# Patient Record
Sex: FEMALE | Race: WHITE | NOT HISPANIC OR LATINO | Employment: FULL TIME | URBAN - METROPOLITAN AREA
[De-identification: names, ages, dates, MRNs, and addresses within clinical notes are randomized per-mention and may not be internally consistent; named-entity substitution may affect disease eponyms.]

---

## 2018-01-14 ENCOUNTER — HOSPITAL ENCOUNTER (EMERGENCY)
Facility: CLINIC | Age: 53
Discharge: HOME | End: 2018-01-14
Attending: PHYSICIAN ASSISTANT | Admitting: PHYSICIAN ASSISTANT
Payer: COMMERCIAL

## 2018-01-14 VITALS
DIASTOLIC BLOOD PRESSURE: 110 MMHG | WEIGHT: 182 LBS | TEMPERATURE: 97.6 F | OXYGEN SATURATION: 98 % | HEART RATE: 87 BPM | SYSTOLIC BLOOD PRESSURE: 189 MMHG | RESPIRATION RATE: 16 BRPM

## 2018-01-14 DIAGNOSIS — J06.9 VIRAL UPPER RESPIRATORY TRACT INFECTION WITH COUGH: ICD-10-CM

## 2018-01-14 LAB
FLUAV+FLUBV AG SPEC QL: NEGATIVE
FLUAV+FLUBV AG SPEC QL: NEGATIVE
INTERNAL QC OK POCT: YES
S PYO AG THROAT QL IA.RAPID: NORMAL
SPECIMEN SOURCE: NORMAL

## 2018-01-14 PROCEDURE — 87880 STREP A ASSAY W/OPTIC: CPT | Performed by: PHYSICIAN ASSISTANT

## 2018-01-14 PROCEDURE — 87081 CULTURE SCREEN ONLY: CPT | Performed by: PHYSICIAN ASSISTANT

## 2018-01-14 PROCEDURE — G0463 HOSPITAL OUTPT CLINIC VISIT: HCPCS

## 2018-01-14 PROCEDURE — 99213 OFFICE O/P EST LOW 20 MIN: CPT | Mod: Z6 | Performed by: PHYSICIAN ASSISTANT

## 2018-01-14 PROCEDURE — 87804 INFLUENZA ASSAY W/OPTIC: CPT | Performed by: PHYSICIAN ASSISTANT

## 2018-01-14 NOTE — ED AVS SNAPSHOT
[facility]    [address] [address]    Phone:  [phone]    Fax:  [phone]                                       Joyce Hodge   MRN: [id number]    Department:  [facility]   Date of Visit:  1/14/2018           Patient Information     Date Of Birth          1965        Your diagnoses for this visit were:     Viral upper respiratory tract infection with cough        You were seen by Delon Weaver PA-C.        Discharge Instructions         Viral Upper Respiratory Illness (Adult)  You have a viral upper respiratory illness (URI), which is another term for the common cold. This illness is contagious during the first few days. It is spread through the air by coughing and sneezing. It may also be spread by direct contact (touching the sick person and then touching your own eyes, nose, or mouth). Frequent handwashing will decrease risk of spread. Most viral illnesses go away within 7 to 10 days with rest and simple home remedies. Sometimes the illness may last for several weeks. Antibiotics will not kill a virus, and they are generally not prescribed for this condition.    Home care    If symptoms are severe, rest at home for the first 2 to 3 days. When you resume activity, don't let yourself get too tired.    Avoid being exposed to cigarette smoke (yours or others ).    You may use acetaminophen or ibuprofen to control pain and fever, unless another medicine was prescribed. (Note: If you have chronic liver or kidney disease, have ever had a stomach ulcer or gastrointestinal bleeding, or are taking blood-thinning medicines, talk with your healthcare provider before using these medicines.) Aspirin should never be given to anyone under 18 years of age who is ill with a viral infection or fever. It may cause severe liver or brain damage.    Your appetite may be poor, so a light diet is fine. Avoid dehydration by drinking 6 to  8 glasses of fluids per day (water, soft drinks, juices, tea, or soup). Extra fluids will help loosen secretions in the nose and lungs.    Over-the-counter cold medicines will not shorten the length of time you re sick, but they may be helpful for the following symptoms: cough, sore throat, and nasal and sinus congestion. (Note: Do not use decongestants if you have high blood pressure.)  Follow-up care  Follow up with your healthcare provider, or as advised.  When to seek medical advice  Call your healthcare provider right away if any of these occur:    Cough with lots of colored sputum (mucus)    Severe headache; face, neck, or ear pain    Difficulty swallowing due to throat pain    Fever of 100.4 F (38 C)  Call 911, or get immediate medical care  Call emergency services right away if any of these occur:    Chest pain, shortness of breath, wheezing, or difficulty breathing    Coughing up blood    Inability to swallow due to throat pain  Date Last Reviewed: 9/13/2015 2000-2017 [employer] [address] [place] [address]. All rights reserved. This information is not intended as a substitute for professional medical care. Always follow your healthcare professional's instructions.          24 Hour Appointment Hotline       To make an appointment at any [facility], call [phone] ([phone]). If you don't have a family doctor or clinic, we will help you find one. [facility] are conveniently located to serve the needs of you and your family.             Review of your medicines      Our records show that you are taking the medicines listed below. If these are incorrect, please call your family doctor or clinic.        Dose / Directions Last dose taken    IBUPROFEN PO        Refills:  0        LOVASTATIN PO        Refills:  0        oseltamivir 75 MG capsule   Commonly known as:  TAMIFLU   Dose:  75 mg   Quantity:  10 capsule        Take 1 capsule (75 mg) by mouth 2 times  "daily   Refills:  0                Procedures and tests performed during your visit     Beta strep group A culture    Influenza A/B antigen    Rapid strep group A screen POCT      Orders Needing Specimen Collection     None      Pending Results     No orders found from 1/12/2018 to 1/15/2018.            Pending Culture Results     No orders found from 1/12/2018 to 1/15/2018.            Pending Results Instructions     If you had any lab results that were not finalized at the time of your Discharge, you can call the ED Lab Result RN at [phone]. You will be contacted by this team for any positive Lab results or changes in treatment. The nurses are available 7 days a week from 10A to 6:30P.  You can leave a message 24 hours per day and they will return your call.        Test Results From Your Hospital Stay        1/14/2018  6:31 PM      Component Results     Component Value Ref Range & Units Status    Influenza A/B Agn Specimen Nasal  Final    Influenza A Negative NEG^Negative Final    Influenza B Negative NEG^Negative Final    Test results must be correlated with clinical data. If necessary, results   should be confirmed by a molecular assay or viral culture.           1/14/2018  6:05 PM      Component Results     Component Value Ref Range & Units Status    Rapid Strep A Screen Neg neg Final    Internal QC OK Yes  Final                Thank you for choosing [facility]       Thank you for choosing [facility] for your care. Our goal is always to provide you with excellent care. Hearing back from our patients is one way we can continue to improve our services. Please take a few minutes to complete the written survey that you may receive in the mail after you visit with us. Thank you!        [employer] Information     [employer] lets you send messages to your doctor, view your test results, renew your prescriptions, schedule appointments and more. To sign up, go to [email or web address] . Click on \"Log in\" on the left side " "of the screen, which will take you to the Welcome page. Then click on \"Sign up Now\" on the right side of the page.     You will be asked to enter the access code listed below, as well as some personal information. Please follow the directions to create your username and password.     Your access code is: [email or web address]  Expires: 2018  6:39 PM     Your access code will  in 90 days. If you need help or a new code, please call your [place] clinic or [phone].        Care EveryWhere ID     This is your Care EveryWhere ID. This could be used by other organizations to access your [facility] medical records  [id number]        Equal Access to Services     FREDI BARTH : Xander Doherty, ciro sparrow, óscar duncan, fior dudley. So [facility] [phone].    ATENCIÓN: Si habla español, tiene a mcclure disposición servicios gratuitos de asistencia lingüística. Llame al [phone].    We comply with applicable federal civil rights laws and [place] laws. We do not discriminate on the basis of race, color, national origin, age, disability, sex, sexual orientation, or gender identity.            After Visit Summary       This is your record. Keep this with you and show to your community pharmacist(s) and doctor(s) at your next visit.                  "

## 2018-01-14 NOTE — ED AVS SNAPSHOT
[facility]    [address] [address]    Phone:  [phone]    Fax:  [phone]                                       Joyce Hodge   MRN: [id number]    Department:  [facility]   Date of Visit:  1/14/2018           After Visit Summary Signature Page     I have received my discharge instructions, and my questions have been answered. I have discussed any challenges I see with this plan with the nurse or doctor.    ..........................................................................................................................................  Patient/Patient Representative Signature      ..........................................................................................................................................  Patient Representative Print Name and Relationship to Patient    ..................................................               ................................................  Date                                            Time    ..........................................................................................................................................  Reviewed by Signature/Title    ...................................................              ..............................................  Date                                                            Time

## 2018-01-14 NOTE — ED PROVIDER NOTES
Chief Complaint:     Chief Complaint   Patient presents with     Influenza       HPI: Joyce Hodge is an 52 year old female who presents with a cough.   It began  1 day(s) ago and has unchanged.  Cough is dry There is NO, Wheezing, Shortness of Breath, Tightness in chest and chest pain.  She was exposed to influenza A at home.  She is eating and drinking well.      Associated symptoms: sore throat, congestion, achiness and low grade fevers.    Recent travel?  no.    No abdominal pain, or diarrhea,  No vomiting.  No headache or neck pain.    ROS: Further problem focused system review was otherwise negative.     Respiratory History  occasional episodes of bronchitis     Problem history  There is no problem list on file for this patient.       Allergies  Allergies   Allergen Reactions     Morphine Difficulty breathing     Amoxicillin Rash        Smoking History  History   Smoking Status     Current Every Day Smoker     Packs/day: 0.50   Smokeless Tobacco     Not on file        Current Meds  No current facility-administered medications for this encounter.     Current Outpatient Prescriptions:      IBUPROFEN PO, , Disp: , Rfl:      LOVASTATIN PO, , Disp: , Rfl:      oseltamivir (TAMIFLU) 75 MG capsule, Take 1 capsule (75 mg) by mouth 2 times daily, Disp: 10 capsule, Rfl: 0        OBJECTIVE     Vital signs reviewed by Delon Weaver  BP (!) 189/110  Pulse 87  Temp 97.6  F (36.4  C) (Temporal)  Resp 16  Wt 82.6 kg (182 lb)  SpO2 98%     PEFR:  General appearance: healthy, alert and no distress  Ears: R TM - normal: no effusions, no erythema, and normal landmarks, L TM - normal: no effusions, no erythema, and normal landmarks  Eyes: R normal, L normal  Nose: normal  Oropharynx: moderate erythema  Neck: supple and no adenopathy  Lungs: normal and clear to auscultation  Heart: S1, S2 normal, no murmur, click, rub or gallop, regular rate and rhythm, chest is clear without rales or wheezing, no pedal edema, no  JVD, no hepatosplenomegaly        Labs:     Results for orders placed or performed during the hospital encounter of 01/14/18   Rapid strep group A screen POCT   Result Value Ref Range    Rapid Strep A Screen Neg neg    Internal QC OK Yes    Influenza A/B antigen   Result Value Ref Range    Influenza A/B Agn Specimen Nasal     Influenza A Negative NEG^Negative    Influenza B Negative NEG^Negative          ASSESSMENT    1. Viral upper respiratory tract infection with cough        PLAN  RST was negative.  We will call with culture results if positive.   Rest, Push fluids, vaporizer, elevation of head of bed.  Ibuprofen and or Tylenol for any fever or body aches.  Over the counter cough suppressant- PRN- as discussed.   If symptoms worsen, recheck immediately otherwise follow up with your PCP PRN.  Patient verbalized understanding and agreed with this plan.         Delon Weaver  1/14/2018, 5:28 PM       Delon Weaver PA-C  01/14/18 1833

## 2018-01-15 NOTE — DISCHARGE INSTRUCTIONS

## 2018-01-16 LAB
BACTERIA SPEC CULT: NORMAL
Lab: NORMAL
SPECIMEN SOURCE: NORMAL

## 2022-02-02 ENCOUNTER — HOSPITAL ENCOUNTER (EMERGENCY)
Facility: CLINIC | Age: 57
Discharge: HOME | End: 2022-02-02
Attending: FAMILY MEDICINE | Admitting: FAMILY MEDICINE
Payer: COMMERCIAL

## 2022-02-02 DIAGNOSIS — F32.A DEPRESSION, UNSPECIFIED DEPRESSION TYPE: ICD-10-CM

## 2022-02-02 PROCEDURE — 99285 EMERGENCY DEPT VISIT HI MDM: CPT | Performed by: FAMILY MEDICINE

## 2022-02-02 PROCEDURE — 99285 EMERGENCY DEPT VISIT HI MDM: CPT | Mod: 25 | Performed by: FAMILY MEDICINE

## 2022-02-02 PROCEDURE — 90791 PSYCH DIAGNOSTIC EVALUATION: CPT
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
## 2022-02-02 NOTE — DISCHARGE INSTRUCTIONS
"Aftercare Plan  If I am feeling unsafe or I am in a crisis, I will:   Contact my established care providers   Call the National Suicide Prevention Lifeline: [phone]   Go to the nearest emergency room   Call 911     Warning signs that I or other people might notice when a crisis is developing for me: Thoughts I would be better off dead, Not taking care of myself,     Things I am able to do on my own to cope or help me feel better: Burning lily, playing games and being on the phone     Things that I am able to do with others to cope or help me better: Open up and talk, support group, go for a walk,     Things I can use or do for distraction: Go on phone, exercise, burn lily, watch something funny, socialize     Changes I can make to support my mental health and wellness: Make sure I am prioritizing my health. Self Care Practices     People in my life that I can ask for help: , Primary Care Physician, New Therapist     Your [facility] has a mental health crisis team you can call 24/7: [facility] Crisis  [phone]    Other things that are important when I m in crisis: To practice self care. \"In order to take care of others, I have to model and take care of myself.\"     Additional resources and information:       Scheduled Appointment  Date: Monday, 2/7/2022  Time: 9:00 am - 10:00 am  Provider: Archana Gutierrez MA  [facility]  Location: [facility] [employer], [facility], [address] [place] [address], [address], [place] [address]  Phone: [phone]  Type: Teletherapy    Scheduling Instructions  PLEASE DO NOT IGNORE THESE INSTRUCTIONS. WE ARE UNWILLING TO MEET WITH PATIENTS WHOSE SCHEDULERS DO NOT PROVIDE AN EMAIL ADDRESS, INSURANCE INFO, AND A PHONE NUMBER. WE ARE ALSO UNWILLING TO MEET WITH PATIENTS WHO DO NOT WANT TO GO TO COUNSELING :/DO NOT SCHEDULE CLIENTS UNDER 24 hours I am not in network with [facility] (I am in network with [employer]). If a patient has bcbs medicare or [employer], please don't schedule them with me as " "I'm not in network with medicare.    Crisis Lines  Crisis Text Line  Text 770402  You will be connected with a trained live crisis counselor to provide support.    National Hope Line  1.800.SUICIDE [3276574]      Community Resources  Fast Tracker  Linking people to mental health and substance use disorder resources  CrowdFlower.Xsilon     [place] Mental Health Warm Line  Peer to peer support  Monday thru Saturday, 12 pm to 10 pm  502.079.5726 or 2.837.403.9907  Text \"Support\" to 91176    National [place] on Mental Illness (ESPERANZA)  678.321.3357 or 1.888.ESPERANZA.HELPS        Mental Health Apps  My3  https://FameCast.org/    VirtualHopeBox  https://[employer]/apps/virtual-hope-box/    1. Eat Healthy    Mental health and wellness from the inside out and food can be a great benefit. Take 10 today by making a healthy meal, or identifying ways that you can better fuel your body through food.      Ironman All-World athlete Cindy Schuler believes that eating healthy is all about setting goals, identifying what works for you, and finding balance!      You have to offer yourself clement for when you do slip up, and getting right back on track,  said Cindy Schuler.     2. Exercise    Physical activity is a cornerstone of what we do at [facility]. There s increasing evidence that it s effective in supporting solutions for both mental and physical health challenges.     Take 10 today by getting active. 1st Lt. Farrah Martin, Ms. [place] Laura 2020 Finalist, will ruck 10 miles on October 10 - her own way to Pledge 10 and shine a spotlight on the benefits of physical activity on mental health.     Life can often feel chaotic and out of our control especially when we are always taking care of others. Physical activity helps me regain my center of focus; for that window of time, it s about taking care of myself. When I have the physical or tangible reminder of  it s okay to focus on/take care of me  it also serves as a " reminder to take care of myself mentally and emotionally as well,  said Farrah Martin.     3. Sleep    Stress can cause insomnia and loss of sleep can become a stressor. Sleep is an often overlooked, but critical component of mental and physical health. Take a look at your sleep patterns to see if they can be improved.     The Department of Veterans Affairs has some great tips to help you sleep in this [employer] blog post, and [facility] Madhu Mace (Dr.), a competitive runner, has some great perspective to offer on rest, recovery, and sleep in this [employer] blog.      I think sleep is probably the most underappreciated recovery tool because sleep is where repetitive processes take place - from many aspects, not just muscle recovery, it s brain cleansing and hormonal resets,  said Dr. Florence.     4. Avoid Alcohol    During stressful times, people sometimes turn to alcohol to relieve stress or cope with symptoms of mental health challenges. The misuse of alcohol in this way can lead to substance use disorders (MARINO) or other health, relationship, and employment challenges.    Take 10 today by avoiding alcohol, assessing your relationship with alcohol, or reaching out if you need assistance. The Department of Veterans Affairs has information, resources, and a self-assessment tool to help.    5. Stay Connected    Staying connected is a great way to combat the isolation that comes after  from the , or even to combat the isolation Americans across the globe are feeling due to the COVID-19 pandemic.    Experts from the [employer] Agency say that friendships and relationships also positively impact physical health. These relationships are still possible even while social distancing. Organizations and [employer] are offering various online events ranging from art workshops (our friends at [employer] offer arts workshops for veterans, service members, and their families)  to [employer] game tournamCloudcity, and more.    The [employer] [facility] [employer] offers daily opportunities to get involved virtually and tackle challenges with veterans and supporters across the nation.     A sense of belonging to a greater community improves your motivation, health, and happiness. When you see your connection to others, you know that all people struggle and have difficult times. You are not alone,  said Captain Angelika Bell DNP, MS Chief of [employer] at [facility].    6. Take a tech break    Staying connected is important, but it can be easy to become overwhelmed by the news and social media. If you ve found yourself feeling overwhelmed, take 10 with a technology break. Go for a walk, spend some time journaling, or play a board game - on your own or with family and friends. This blog is full of ideas!    7. Meditate or Breathe  Meditation and breathing exercises are great ways to take a few moments to connect with yourself, identify where you re at, and address challenges you are facing.    If you re looking for a guided meditation program, look no further than this Open Road Challenge meditation session presented by our partners at [employer]. If you re interested in coherent breathing exercises, check out this practice by Leon Puckett, Co- of [employer].    [employer], in partnership with [employer], also offers free subscriptions to service members and their families. [employer] employees also receive free access to [employer] as part of the company s commitment to address diverse mental health needs    8. Ask for help    Asking for help is a sign of strength. While you re taking 10, if you find yourself with overwhelming feelings or believe you would benefit from professional assistance, there are many resources out there for you.     If you re in crisis, the Department of Veterans Affairs Crisis Line (1-876.821.6347) and National Suicide Prevention Lifeline (1-885.939.7197) are  here for you right now. If you re looking for counseling or other types of assistance, click here for a list of partners and resources for veterans and their families.    9. Stay informed     Staying up to date on mental health and physical health best practices is a great way to support and encourage your own mental health hygiene.     Using trusted sources, like this National Institutes of Health Emotional Wellness Toolkit, can offer new ideas and new ways to adjust your habits and make a plan.     10. Get outside  Sometimes, just getting outside for a walk around the neighborhood, going on a short road trip to a national park, or even sitting outside with your pet makes all the difference.

## 2022-02-02 NOTE — ED TRIAGE NOTES
Pt speaking with DEC for son's ER visit. Pt needed to be arrived as a pt to receive the resources that she needed for herself.

## 2022-02-02 NOTE — ED PROVIDER NOTES
"  HPI   The patient is a 56-year-old female presenting by private car with concern for depression and suicidal ideation.  She is here with her son because he is having outbursts of anger.  She does not feel safe at home.  She is torn because she has to find placement for her son and yet she acknowledges that she loves him dearly and does not want him to leave.  She has transient thoughts of killing herself \"so this is just done.\"  No plan.  She has not had previous attempts.  She is requesting help as an outpatient.        Allergies:  Allergies   Allergen Reactions     Morphine Difficulty breathing     Amoxicillin Rash     Problem List:    There are no problems to display for this patient.     Past Medical History:    No past medical history on file.  Past Surgical History:    No past surgical history on file.  Family History:    No family history on file.  Social History:  Marital Status:   [2]  Social History     Tobacco Use     Smoking status: Current Every Day Smoker     Packs/day: 0.50     Smokeless tobacco: Not on file   Substance Use Topics     Alcohol use: Yes     Drug use: No      Medications:    IBUPROFEN PO  LOVASTATIN PO  oseltamivir (TAMIFLU) 75 MG capsule      Review of Systems   All other systems reviewed and are negative.      PE      Physical Exam  Vitals reviewed.   Constitutional:       General: She is not in acute distress.     Appearance: She is well-developed.      Comments: Tired appearing.  Stressed but cooperative.   HENT:      Head: Normocephalic and atraumatic.      Right Ear: External ear normal.      Left Ear: External ear normal.      Nose: Nose normal.      Mouth/Throat:      Mouth: Mucous membranes are moist.      Pharynx: Oropharynx is clear.   Eyes:      Extraocular Movements: Extraocular movements intact.      Conjunctiva/sclera: Conjunctivae normal.      Pupils: Pupils are equal, round, and reactive to light.   Cardiovascular:      Rate and Rhythm: Normal rate and regular " rhythm.   Pulmonary:      Effort: Pulmonary effort is normal.   Musculoskeletal:         General: Normal range of motion.      Cervical back: Normal range of motion.   Skin:     General: Skin is warm and dry.   Neurological:      Mental Status: She is alert and oriented to person, place, and time.   Psychiatric:         Behavior: Behavior normal.         ED COURSE and [facility]   0820.  DEC assessment complete.  Outpatient management will be scheduled.  No need for emergent hospitalization.  Patient agrees with the plan.  No medication changes.    LABS  Labs Ordered and Resulted from Time of ED Arrival to Time of ED Departure - No data to display    IMAGING  Images reviewed by me.  Radiology report also reviewed.  No orders to display       Procedures    Medications - No data to display      IMPRESSION       ICD-10-CM    1. Depression, unspecified depression type  F32.A             Medication List      There are no discharge medications for this visit.                       George Somers MD  02/02/22 [id number]

## 2022-02-19 ENCOUNTER — HEALTH MAINTENANCE LETTER (OUTPATIENT)
Age: 57
End: 2022-02-19

## 2022-10-22 ENCOUNTER — HEALTH MAINTENANCE LETTER (OUTPATIENT)
Age: 57
End: 2022-10-22

## 2023-11-05 ENCOUNTER — HEALTH MAINTENANCE LETTER (OUTPATIENT)
Age: 58
End: 2023-11-05

## 2024-03-24 ENCOUNTER — HEALTH MAINTENANCE LETTER (OUTPATIENT)
Age: 59
End: 2024-03-24

## 2024-07-28 ENCOUNTER — OFFICE VISIT (OUTPATIENT)
Dept: URGENT CARE | Facility: URGENT CARE | Age: 59
End: 2024-07-28
Payer: COMMERCIAL

## 2024-07-28 VITALS
HEART RATE: 125 BPM | SYSTOLIC BLOOD PRESSURE: 113 MMHG | WEIGHT: 180 LBS | OXYGEN SATURATION: 97 % | TEMPERATURE: 97 F | DIASTOLIC BLOOD PRESSURE: 75 MMHG

## 2024-07-28 DIAGNOSIS — R07.0 THROAT PAIN: ICD-10-CM

## 2024-07-28 DIAGNOSIS — B34.9 VIRAL ILLNESS: Primary | ICD-10-CM

## 2024-07-28 LAB
DEPRECATED S PYO AG THROAT QL EIA: NEGATIVE
GROUP A STREP BY PCR: NOT DETECTED

## 2024-07-28 PROCEDURE — 87651 STREP A DNA AMP PROBE: CPT

## 2024-07-28 PROCEDURE — 99203 OFFICE O/P NEW LOW 30 MIN: CPT

## 2024-07-28 ASSESSMENT — PAIN SCALES - GENERAL: PAINLEVEL: MODERATE PAIN (4)

## 2024-07-28 NOTE — PATIENT INSTRUCTIONS
Strep test is negative.  Get plenty of rest and drink fluids.  Can use Tylenol and/or ibuprofen as needed for pain.  Maximum dose of Tylenol is 4000mg in a 24 hour period of time.  Take ibuprofen with food to avoid stomach upset.  You can also try warm salt water gargles, hot/warm water or tea with honey and/or lemon and/or Cepacol lozenges or spray for your sore throat.

## 2024-07-28 NOTE — PROGRESS NOTES
ASSESSMENT:   (B34.9) Viral illness  (primary encounter diagnosis)    (R07.0) Throat pain  Plan: Streptococcus A Rapid Screen w/Reflex to PCR -         Clinic Collect, Group A Streptococcus PCR         Throat Swab    PLAN:  Informed the patient that the strep test is negative and that her symptoms are likely related to a viral illness pending the strep PCR result.  We discussed the need for her to get plenty of rest, drink fluids and use Tylenol and or ibuprofen as needed for pain with the maximum dose of Tylenol being 4000 mg in a 24-hour period of time and to take ibuprofen with food to avoid upset stomach.  We also discussed trying warm salt water gargles, hot/warm water or tea with honey under lemon and or Cepacol lozenges or spray for the sore throat.  Informed the patient to return to clinic with any new or worsening symptoms.  Patient acknowledged her understanding of the above plan.    The use of Dragon/gopogoation services may have been used to construct the content in this note; any grammatical or spelling errors are non-intentional. Please contact the author of this note directly if you are in need of any clarification.      Khoa Haddad, MONIQUE CNP      SUBJECTIVE:   Joyce Hodge  is a 59 year old female who is here today because of: Sore Throat.  Onset of symptoms was 2 days ago. Course of illness is same.  Patient admits to exposure to illness at home.   Patient denies fever, cough, earache, nasal congestion/runny nose, vomiting, and diarrhea  Treatment measures tried include none.    ROS:  Negative except noted above.      OBJECTIVE:   /75   Pulse (!) 125   Temp 97  F (36.1  C) (Tympanic)   Wt 81.6 kg (180 lb)   SpO2 97%   General: healthy, alert and no distress  Eyes - conjunctivae clear.  Ears - External ears normal. Canals clear. TM's normal.  Nose/Sinuses - Nares normal.Mucosa normal. No drainage or sinus tenderness.  Oropharynx - Lips, mucosa, oropharynx and  tongue normal.  Neck - Neck supple; no lymphadenopathy    Labs:  Rapid Strep test is negative; await throat culture results.

## 2024-12-22 ENCOUNTER — HEALTH MAINTENANCE LETTER (OUTPATIENT)
Age: 59
End: 2024-12-22